# Patient Record
(demographics unavailable — no encounter records)

---

## 2025-07-11 NOTE — REASON FOR VISIT
[Initial Consultation] : an initial consultation for [S/P Recent Hospitalization] : status post recent hospitalization [Father] : father

## 2025-07-15 NOTE — HISTORY OF PRESENT ILLNESS
[FreeTextEntry1] : We had the pleasure of seeing Mariann in cardiology clinic today. She is a 13-year-old who had a history an abnormal ECG and possible syncopal episode for which she recently presented to [facility] ED.   Per father the patient initially presented one week prior with a change in baseline behavior with arm numbness, anxiety, hallucinations, and odd hand movements. At the emergency room she was evaluated and subsequently transferred for concern for underlying psychiatry cause.   Per report her vital signs and ED examination was largely not concerning for underlying anatomic cause of her symptoms. She had an ECG which showed sinus rhythm with non-specific T wave and ST abnormalities. In turn she was referred to cardiology for the abnormal ECG findings.   She otherwise has not had any significant past medical history. She has never had any events similar to the one she presented during this admission.  She is active and able to keep up with her peers.   There has been no recent change in activity level, no fatigue, and no difficulty gaining weight or weight loss.  Specifically denies any symptoms with exertion: such as palpitations, shortness of breath, chest pain, loss of consciousness, or unexpected elevated heart rates.   No family history of an arrhythmia, aortic aneurysm, unexplained death, bicuspid aortic valve, congenital heart disease, cardiomyopathy or sudden cardiac death, long QT syndrome, drowning or unexplained accidental death.

## 2025-07-15 NOTE — HISTORY OF PRESENT ILLNESS
[FreeTextEntry1] : We had the pleasure of seeing Mariann in cardiology clinic today. She is a 13-year-old who had a history an abnormal ECG and possible syncopal episode for which she recently presented to [facility] ED.   Per father the patient initially presented one week prior with a change in baseline behavior with arm numbness, anxiety, hallucinations, and odd hand movements. At the emergency room she was evaluated and subsequently transferred for concern for underlying psychiatry cause.   Per report her vital signs and ED examination was largely not concerning for underlying anatomic cause of her symptoms. She had an ECG which showed sinus rhythm with non-specific T wave and ST abnormalities. In turn she was referred to cardiology for the abnormal ECG findings.   She otherwise has not had any significant past medical history. She has never had any events similar to the one she presented during this admission.  She is active and able to keep up with her peers.   There has been no recent change in activity level, no fatigue, and no difficulty gaining weight or weight loss.  Specifically denies any symptoms with exertion: such as palpitations, shortness of breath, chest pain, loss of consciousness, or unexpected elevated heart rates.   No family history of an arrhythmia, aortic aneurysm, unexplained death, bicuspid aortic valve, congenital heart disease, cardiomyopathy or sudden cardiac death, long QT syndrome, drowning or unexplained accidental death.

## 2025-07-15 NOTE — CARDIOLOGY SUMMARY
[de-identified] : 7/11/2025 [FreeTextEntry1] : Sinus rate and rhythm with normal intervals. No evidence of pre-excitation.  BPM. [de-identified] : 7/11/2025 [FreeTextEntry2] : Summary: 1. {S,D,S\} Situs solitus, D-ventricular looping, normally related great arteries. 2. Normal appearing mitral valve with trivial insufficiency and no stenosis. 3. No evidence of pulmonary hypertension based on systolic interventricular septal configuration, but quantitative estimates of pulmonary artery pressure were inadequate. 4. Normal right ventricular morphology with qualitatively normal size and systolic function. 5. Normal left ventricular size, morphology and systolic function. 6. No pericardial effusion.

## 2025-07-15 NOTE — CONSULT LETTER
[Today's Date] : [unfilled] [Name] : Name: [unfilled] [] : : ~~ [Today's Date:] : [unfilled] [Dear  ___:] : Dear Dr. [unfilled]: [Consult - Single Provider] : Thank you very much for allowing me to participate in the care of this patient. If you have any questions, please do not hesitate to contact me. [Sincerely,] : Sincerely, [Consult] : I had the pleasure of evaluating your patient, [unfilled]. My full evaluation follows. [FreeTextEntry4] : Dr. Reva Velazquez Flower [FreeTextEntry5] : [phone] [de-identified] : Audie Emerson MD Pediatric Cardiologist Tel: [phone] [facility] [address] [place] [address]

## 2025-07-15 NOTE — CONSULT LETTER
[Today's Date] : [unfilled] [Name] : Name: [unfilled] [] : : ~~ [Today's Date:] : [unfilled] [Dear  ___:] : Dear Dr. [unfilled]: [Consult - Single Provider] : Thank you very much for allowing me to participate in the care of this patient. If you have any questions, please do not hesitate to contact me. [Sincerely,] : Sincerely, [Consult] : I had the pleasure of evaluating your patient, [unfilled]. My full evaluation follows. [FreeTextEntry4] : Dr. Reva Velazquez Flower [FreeTextEntry5] : [phone] [de-identified] : Audie Emerson MD Pediatric Cardiologist Tel: [phone] [facility] [address] [place] [address]

## 2025-07-15 NOTE — DISCUSSION/SUMMARY
[PE + No Restrictions] : [unfilled] may participate in the entire physical education program without restriction, including all varsity competitive sports. [FreeTextEntry1] : We had the pleasure of seeing Mariann in cardiology clinic today. She is a 13-year-old who had a history an abnormal ECG and possible syncopal episode for which she recently presented to [facility] ED. Her ECG, exam, and echocardiogram at this time were all reassuring. Specifically, her ECG today was normal with normal intervals and no evidence of arrhythmia. Her echocardiogram showed normal biventricular size and function with normal intervals, and no structural defects were identified. Overall, at this time with a normal exam and work-up it is unlikely any of her initial symptoms were related to cardiac pathology. To complete her work-up will place a Holter monitor to assess for arrhythmia. If normal no further follow up would be required.   Recommendations: - Will place a Holter monitor today to be worn for ~ 7 days. If normal and symptoms resolve no further cardiology, follow up would be needed. If there are any pertinent findings on the monitor or if symptoms progress will determine follow up at that time - No restrictions to age-appropriate exertion / exercise - No SBE prophylaxis is required - Mariann is at no higher cardiac risk than standard for future procedures - Please contact our team with further questions or concerns [Needs SBE Prophylaxis] : [unfilled] does not need bacterial endocarditis prophylaxis

## 2025-07-15 NOTE — PHYSICAL EXAM
[General Appearance - Alert] : alert [General Appearance - In No Acute Distress] : in no acute distress [General Appearance - Well Nourished] : well nourished [General Appearance - Well Developed] : well developed [General Appearance - Well-Appearing] : well appearing [Appearance Of Head] : the head was normocephalic [Facies] : there were no dysmorphic facial features [Sclera] : the conjunctiva were normal [Outer Ear] : the ears and nose were normal in appearance [Examination Of The Oral Cavity] : mucous membranes were moist and pink [Auscultation Breath Sounds / Voice Sounds] : breath sounds clear to auscultation bilaterally [Normal Chest Appearance] : the chest was normal in appearance [Apical Impulse] : quiet precordium with normal apical impulse [Heart Rate And Rhythm] : normal heart rate and rhythm [Heart Sounds] : normal S1 and S2 [No Murmur] : no murmurs  [Heart Sounds Gallop] : no gallops [Heart Sounds Pericardial Friction Rub] : no pericardial rub [Edema] : no edema [Arterial Pulses] : normal upper and lower extremity pulses with no pulse delay [Heart Sounds Click] : no clicks [Capillary Refill Test] : normal capillary refill [Bowel Sounds] : normal bowel sounds [Abdomen Soft] : soft [Nondistended] : nondistended [Abdomen Tenderness] : non-tender [Nail Clubbing] : no clubbing  or cyanosis of the fingers [Motor Tone] : normal muscle strength and tone [Cervical Lymph Nodes Enlarged Anterior] : The anterior cervical nodes were normal [Cervical Lymph Nodes Enlarged Posterior] : The posterior cervical nodes were normal [] : no rash [Skin Turgor] : normal turgor [Demonstrated Behavior - Infant Nonreactive To Parents] : interactive

## 2025-07-15 NOTE — REVIEW OF SYSTEMS
[Palpitations] : palpitations [Abdominal Pain] : abdominal pain [Fainting (Syncope)] : fainting [Dizziness] : dizziness [Sleep Disturbances] : ~T sleep disturbances [Anxiety] : anxiety [Feeling Poorly] : not feeling poorly (malaise) [Fever] : no fever [Wgt Loss (___ Lbs)] : no recent weight loss [Pallor] : not pale [Redness] : no redness [Sore Throat] : no sore throat [Cyanosis] : no cyanosis [Edema] : no edema [Diaphoresis] : not diaphoretic [Chest Pain] : no chest pain or discomfort [Exercise Intolerance] : no persistence of exercise intolerance [Orthopnea] : no orthopnea [Fast HR] : no tachycardia [Tachypnea] : not tachypneic [Wheezing] : no wheezing [Cough] : no cough [Shortness Of Breath] : not expressed as feeling short of breath [Vomiting] : no vomiting [Diarrhea] : no diarrhea [Decrease In Appetite] : appetite not decreased [Seizure] : no seizures [Rash] : no rash [Failure To Thrive] : no failure to thrive [FreeTextEntry1] : Went to ER on 6/22/2025 for difficulty breathing. Developed L arm tingling in ambulance and blacked out per pt.

## 2025-07-15 NOTE — DISCUSSION/SUMMARY
[PE + No Restrictions] : [unfilled] may participate in the entire physical education program without restriction, including all varsity competitive sports. [FreeTextEntry1] : We had the pleasure of seeing Mariann in cardiology clinic today. She is a 13-year-old who had a history an abnormal ECG and possible syncopal episode for which she recently presented [place] [facility] [place]. Her ECG, exam, and echocardiogram at this time were all reassuring. Specifically, her ECG today was normal with normal intervals and no evidence of arrhythmia. Her echocardiogram showed normal biventricular size and function with normal intervals, and no structural defects were identified. Overall, at this time with a normal exam and work-up it is unlikely any of her initial symptoms were related to cardiac pathology. To complete her work-up will place a Holter monitor to assess for arrhythmia. If normal no further follow up would be required.   Recommendations: - Will place a Holter monitor today to be worn for ~ 7 days. If normal and symptoms resolve no further cardiology, follow up would be needed. If there are any pertinent findings on the monitor or if symptoms progress will determine follow up at that time - No restrictions to age-appropriate exertion / exercise - No SBE prophylaxis is required - Mariann is at no higher cardiac risk than standard for future procedures - Please contact our team with further questions or concerns [Needs SBE Prophylaxis] : [unfilled] does not need bacterial endocarditis prophylaxis

## 2025-07-15 NOTE — CARDIOLOGY SUMMARY
[de-identified] : 7/11/2025 [FreeTextEntry1] : Sinus rate and rhythm with normal intervals. No evidence of pre-excitation.  BPM. [de-identified] : 7/11/2025 [FreeTextEntry2] : Summary: 1. {S,D,S\} Situs solitus, D-ventricular looping, normally related great arteries. 2. Normal appearing mitral valve with trivial insufficiency and no stenosis. 3. No evidence of pulmonary hypertension based on systolic interventricular septal configuration, but quantitative estimates of pulmonary artery pressure were inadequate. 4. Normal right ventricular morphology with qualitatively normal size and systolic function. 5. Normal left ventricular size, morphology and systolic function. 6. No pericardial effusion.